# Patient Record
Sex: FEMALE | Race: WHITE | Employment: PART TIME | URBAN - METROPOLITAN AREA
[De-identification: names, ages, dates, MRNs, and addresses within clinical notes are randomized per-mention and may not be internally consistent; named-entity substitution may affect disease eponyms.]

---

## 2023-09-20 ENCOUNTER — OFFICE VISIT (OUTPATIENT)
Dept: UROGYNECOLOGY | Age: 63
End: 2023-09-20

## 2023-09-20 VITALS — HEIGHT: 66 IN | WEIGHT: 172.2 LBS | BODY MASS INDEX: 27.68 KG/M2

## 2023-09-20 DIAGNOSIS — N81.3 UTEROVAGINAL PROLAPSE, COMPLETE: ICD-10-CM

## 2023-09-20 DIAGNOSIS — R33.8 OTHER RETENTION OF URINE: Primary | ICD-10-CM

## 2023-09-20 LAB
BILIRUBIN, URINE, POC: NEGATIVE
BLOOD URINE, POC: NEGATIVE
GLUCOSE URINE, POC: NEGATIVE
KETONES, URINE, POC: NEGATIVE
LEUKOCYTE ESTERASE, URINE, POC: NEGATIVE
NITRITE, URINE, POC: NEGATIVE
PH, URINE, POC: 5.5 (ref 4.6–8)
PROTEIN,URINE, POC: NEGATIVE
SPECIFIC GRAVITY, URINE, POC: 1.01 (ref 1–1.03)
URINALYSIS CLARITY, POC: CLEAR
URINALYSIS COLOR, POC: YELLOW
UROBILINOGEN, POC: NORMAL

## 2023-09-20 RX ORDER — OMEGA-3/DHA/EPA/FISH OIL 300-1000MG
2 CAPSULE ORAL 2 TIMES DAILY
COMMUNITY

## 2023-09-20 RX ORDER — MINOXIDIL 2.5 MG/1
2.5 TABLET ORAL DAILY
COMMUNITY

## 2023-09-20 RX ORDER — LISINOPRIL 20 MG/1
20 TABLET ORAL DAILY
COMMUNITY

## 2023-09-20 RX ORDER — SUVOREXANT 10 MG/1
10 TABLET, FILM COATED ORAL DAILY
COMMUNITY

## 2023-09-20 RX ORDER — LORATADINE 10 MG/1
CAPSULE, LIQUID FILLED ORAL
COMMUNITY

## 2023-09-20 RX ORDER — RIBOFLAVIN (VITAMIN B2) 100 MG
1 TABLET ORAL EVERY MORNING
COMMUNITY

## 2023-09-20 RX ORDER — ESTRADIOL 0.1 MG/G
2 CREAM VAGINAL
COMMUNITY

## 2023-09-20 RX ORDER — OMEPRAZOLE 20 MG/1
20 CAPSULE, DELAYED RELEASE ORAL DAILY
COMMUNITY

## 2023-09-20 RX ORDER — TRIAMCINOLONE ACETONIDE 1 MG/G
CREAM TOPICAL 2 TIMES DAILY
COMMUNITY

## 2023-09-20 RX ORDER — ATORVASTATIN CALCIUM 40 MG/1
40 TABLET, FILM COATED ORAL DAILY
COMMUNITY

## 2023-09-20 RX ORDER — AMLODIPINE BESYLATE 2.5 MG/1
2.5 TABLET ORAL DAILY
COMMUNITY

## 2023-09-20 RX ORDER — FAMOTIDINE 20 MG/1
20 TABLET, FILM COATED ORAL DAILY
COMMUNITY

## 2023-09-20 NOTE — ASSESSMENT & PLAN NOTE
We discussed the epidemiology, pathogenesis and etiology of pelvic organ prolapse. This is a chronic condition that has progressed. We discussed the potential risk factors which include genetics and environmental factors, such as childbirth, aging, menopause, straining, and previous surgery. I offered her management options which included nothing, pessary, and surgery. She is interested in: a pessary and is set up to get a cube pessary with her GYN. Decisions regarding major surgery were discussed today. She is also set up for PT at [facility]!!! She knows all of her options. I also gave her precautions and instructions with a cube pessary. We also reviewed expectations with PT. I agree with the management plan set up by her GYN Dr. Marisa Bustamante. She knows to give us a call if she desires surgical management in the future.

## 2023-09-20 NOTE — PROGRESS NOTES
options which included nothing, pessary, and surgery. She is interested in: a pessary and is set up to get a cube pessary with her GYN. Decisions regarding major surgery were discussed today. She is also set up for PT at [facility]!!! She knows all of her options. I also gave her precautions and instructions with a cube pessary. We also reviewed expectations with PT. I agree with the management plan set up by her GYN Dr. Lester Montenegro. She knows to give us a call if she desires surgical management in the future. No follow-ups on file. On this date 9/20/2023 I have spent 47 minutes reviewing previous notes, test results and face to face with the patient discussing the diagnosis and importance of compliance with the treatment plan as well as documenting on the day of the visit.     Raman Parkinson, DO